# Patient Record
Sex: FEMALE | Employment: STUDENT | URBAN - METROPOLITAN AREA
[De-identification: names, ages, dates, MRNs, and addresses within clinical notes are randomized per-mention and may not be internally consistent; named-entity substitution may affect disease eponyms.]

---
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
## 2023-02-22 ENCOUNTER — TELEPHONE (OUTPATIENT)
Dept: FAMILY MEDICINE | Facility: CLINIC | Age: 23
End: 2023-02-22
Payer: MEDICAID

## 2023-02-22 NOTE — TELEPHONE ENCOUNTER
----- Message from Re Montanez sent at 2/22/2023  1:53 PM CST -----  Contact: Pt @ [phone]  Type:  Sooner Apoointment Request    Caller is requesting a sooner appointment.  Caller declined first available appointment listed below.  Caller will not accept being placed on the waitlist and is requesting a message be sent to doctor.  Name of Caller:Pt  When is the first available appointment? N/A  Symptoms:Annual Physical, Bad Anxiety causing patient to regurgitate  Would the patient rather a call back or a response via [facility]? call  Best Call Back Number:[phone]  Additional Information: Pt would like a call to schedule an appt. Please call pt to schedule.

## 2023-12-20 NOTE — PROGRESS NOTES
"Physical Therapy Daily Note     Name: Beryl Perea  Clinic Number: [id number]  Diagnosis:   Encounter Diagnoses   Name Primary?    Chronic pain of left knee     Decreased strength of lower extremity     Decreased range of motion (ROM) of left knee      Physician: Tristian Andersen MD  Treatment Orders: PT Eval and Treat consider modalities to include dry needling or iontophoresis    Precautions: as per diagnosis  Visit # authorized: 5/6 on 10/29/2018  Authorization period: 12-31-18  Plan of care expiration: 11-26-18  MD Follow up appt: none scheduled    Start Time: 7:36 (began session with SHALONDA Abraham, PT)  End Time:  8:17  Total billable time: 30    Subjective     Pt reports: knee feels "a little weird" after having needles removed, but no increase of pain reported.    Pain Scale: 0/10 after needles, 0/10 post txx    Objective       TREATMENT  Therapeutic exercise: Beryl received therapeutic exercises to develop strength, endurance and core stabilization for 30 minutes including:   Worked with pt on quad sets and unable to perform without c/o increased pain.  No lateral tracking noted    Quad sets 2/10 focus on VMO  SLR x 2/10  Hip abd sidelying x 2/10  Hip add sidelying x 2/10    Hip ext prone x 2/10  Hip ext prone with bent knee x 10    HS stretch sitting on table x 10   Gastroc runner's stretch x 10    Heel raises x 2/10 slight ankle pain no c/o with other CKC  Minisquat x 15   Lateral step up x 10   Forward step up    (performed with SHALONDA Abraham) Kinesiotape was performed with 2 10" I strips surrounding knee with 6" I strip for decompression over patella tendon region at scar for pain relief and support.  Instructed pt in use, care and precautions with tape.      Ice to left knee elevated for 10 min s/p treatment for pain      Written Home Exercises Provided: no new today, pt to continue with current written HEP  Pt demo good understanding of the education provided. Beryl demonstrated good return " Nursing called to state that koko and her sister are concerned about the edema in her lower legs now.    Koko sits and sleeps in her recliner without elevating them.  No type of TRENT stockings.    Already on Lasix 40mg in AM and 20mg in afternoon.    New orders:    Lasix 40mg po BID for CHF  K+ level tomorrow if possible for CHF  BMP in two weeks - chf    NORA Balderas CNP     demonstration of activities.     Pt. education:  · Posture reeducation, body mechanics, HEP,   · No spiritual or educational barriers to learning provided  · Pt has no cultural, educational or language barriers to learning provided.    Assessment   Overall good tolerance to completed exercises, occasional cues to prevent L knee valgus with mini squat and lateral step up. Improving VMO activation with quat sets however patient required occasional cues to maintain knee extension with SLR exercises in all planes. Pain to anterior L knee initially with mini squats so exercise was held until after step up exercises, to which mini squats were then attempted again and were pain free. Pain remained 0/10 post tx.    Patient responded well to dry needling as per dry needle treatment form with decreased pain and symptoms as noted above.Kiara progression with CKC with slight irritation to ankle with heel raises as per pt but tolerated others well Able to ambulate with normal gait at end of session   Pt will continue to benefit from skilled outpatient physical therapy to address the remaining functional deficits, provide pt/family education, and to maximize pt's level of independence in the home and community environment. .     Short Term Goals:  3weeks  1. Pt will present with increased L knee AROM into flexion by 15 degrees for increased mobility with climbing stairs into home  2. Pt will present with increased strength to L knee into flex and ext to 4+/5  3. Pt will present with increased strength to B hips by one half grade for decreased stress to L knee.   4. Pt will present with increased HS length on L by 5 degrees to decrease stress to anterior knee.   5. Pt will report decreased pain to L knee with activity to </= 6/10.     Long Term Goals: 6 weeks  1. Pt will present with increased L knee AROM into flexion to equal R.  2. Pt will present with increased strength to L knee into flex and ext to 5/5  3. Pt will present with  increased strength to B hips by one full grade for decreased stress to L knee.   4. Pt will present with increased HS length on L by 10 degrees to decrease stress to anterior knee.   5. Pt will report decreased pain to L knee with activity to </= 4/10.  6. Pt will be independent with HEP and self management of symptoms    Anticipated barriers to physical therapy: none  Pt's spiritual, cultural and educational needs considered and pt agreeable to plan of care and goals        Plan   Continue with established Plan of Care towards PT goals. Progress strengthening as tolerated.    Anahy Garrett, PTA